# Patient Record
Sex: FEMALE | Race: WHITE | NOT HISPANIC OR LATINO | URBAN - METROPOLITAN AREA
[De-identification: names, ages, dates, MRNs, and addresses within clinical notes are randomized per-mention and may not be internally consistent; named-entity substitution may affect disease eponyms.]

---

## 2017-09-21 ENCOUNTER — IMPORTED ENCOUNTER (OUTPATIENT)
Dept: URBAN - METROPOLITAN AREA CLINIC 9 | Facility: CLINIC | Age: 67
End: 2017-09-21

## 2018-01-05 ENCOUNTER — IMPORTED ENCOUNTER (OUTPATIENT)
Dept: URBAN - METROPOLITAN AREA CLINIC 9 | Facility: CLINIC | Age: 68
End: 2018-01-05

## 2018-03-09 NOTE — PATIENT DISCUSSION
2 day PO: Patient is doing well post-operatively. The importance of post-op drop compliance was emphasized. Drop schedule reviewed with patient. Patient to call if any visual changes or concerns.

## 2018-03-12 NOTE — PATIENT DISCUSSION
5 day PO: Patient is doing well post-operatively. The importance of post-op drop compliance was emphasized. Drop schedule reviewed with patient. Patient to call if any visual changes or concerns.

## 2018-08-15 ENCOUNTER — IMPORTED ENCOUNTER (OUTPATIENT)
Dept: URBAN - METROPOLITAN AREA CLINIC 9 | Facility: CLINIC | Age: 68
End: 2018-08-15

## 2018-08-22 NOTE — PROCEDURE NOTE: CLINICAL
PROCEDURE NOTE: Punctal Plugs, Kizzy Pena ([id number], [id number]) #2 Bilateral Lower Lids. Diagnosis: Dry Eye Syndrome.

## 2019-02-20 ENCOUNTER — IMPORTED ENCOUNTER (OUTPATIENT)
Dept: URBAN - METROPOLITAN AREA CLINIC 9 | Facility: CLINIC | Age: 69
End: 2019-02-20

## 2019-07-17 ENCOUNTER — IMPORTED ENCOUNTER (OUTPATIENT)
Dept: URBAN - METROPOLITAN AREA CLINIC 9 | Facility: CLINIC | Age: 69
End: 2019-07-17

## 2019-07-31 ENCOUNTER — IMPORTED ENCOUNTER (OUTPATIENT)
Dept: URBAN - METROPOLITAN AREA CLINIC 9 | Facility: CLINIC | Age: 69
End: 2019-07-31

## 2020-05-13 ENCOUNTER — IMPORTED ENCOUNTER (OUTPATIENT)
Dept: URBAN - METROPOLITAN AREA CLINIC 9 | Facility: CLINIC | Age: 70
End: 2020-05-13

## 2020-09-23 ENCOUNTER — IMPORTED ENCOUNTER (OUTPATIENT)
Dept: URBAN - METROPOLITAN AREA CLINIC 9 | Facility: CLINIC | Age: 70
End: 2020-09-23

## 2020-11-06 ENCOUNTER — IMPORTED ENCOUNTER (OUTPATIENT)
Dept: URBAN - METROPOLITAN AREA CLINIC 9 | Facility: CLINIC | Age: 70
End: 2020-11-06

## 2021-03-31 NOTE — PATIENT DISCUSSION
[employer] OD rivka OS -0.50 to -0.75. Graft Cartilage Fenestration Text: The cartilage was fenestrated with a 2mm punch biopsy to help facilitate graft survival and healing.

## 2021-04-07 ENCOUNTER — IMPORTED ENCOUNTER (OUTPATIENT)
Dept: URBAN - METROPOLITAN AREA CLINIC 9 | Facility: CLINIC | Age: 71
End: 2021-04-07

## 2021-04-07 PROBLEM — Z79.899: Noted: 2021-04-07

## 2021-04-07 PROBLEM — H16.203: Noted: 2021-04-07

## 2021-04-07 PROBLEM — INACTIVE: Noted: 2021-04-07

## 2021-10-19 ASSESSMENT — VISUAL ACUITY
OD_SC: 20/25 -2 SN
OS_PH: 20/25 - SN
OD_SC: 20/25 SN
OS_SC: 20/40 SN
OS_CC: 20/30 SN
OS_SC: 20/40 SN
OD_SC: 20/25 + SN
OS_PH: 20/25 + SN
OD_CC: 20/20 SN
OD_SC: 20/20 -2 SN
OD_SC: 20/25 +2 SN
OS_CC: 20/30 SN
OD_CC: 20/20 SN
OS_SC: 20/40 -2 SN
OS_CC: 20/20 SN
OD_SC: 20/20 -2 SN
OS_PH: 20/30 - SN
OD_CC: 20/25 +2 SN
OS_SC: 20/40 SN
OS_SC: 20/50 SN
OS_SC: 20/50 -2 SN
OS_PH: 20/25 SN
OD_SC: 20/25 SN
OS_SC: 20/50 -2 SN

## 2021-10-19 ASSESSMENT — KERATOMETRY
OD_K2POWER_DIOPTERS: 41
OS_K2POWER_DIOPTERS: 41.25
OS_AXISANGLE2_DEGREES: 136
OD_AXISANGLE_DEGREES: 80
OS_AXISANGLE_DEGREES: 46
OS_K2POWER_DIOPTERS: 41.75
OD_K1POWER_DIOPTERS: 40.75
OD_K1POWER_DIOPTERS: 40.5
OD_AXISANGLE2_DEGREES: 175
OS_K1POWER_DIOPTERS: 41
OD_AXISANGLE2_DEGREES: 170
OS_AXISANGLE_DEGREES: 33
OD_K2POWER_DIOPTERS: 41
OD_AXISANGLE_DEGREES: 85
OS_K1POWER_DIOPTERS: 40.25
OS_AXISANGLE2_DEGREES: 123

## 2021-10-19 ASSESSMENT — TONOMETRY
OS_IOP_MMHG: 14
OD_IOP_MMHG: 15
OD_IOP_MMHG: 16
OS_IOP_MMHG: 15
OS_IOP_MMHG: 15
OD_IOP_MMHG: 18
OD_IOP_MMHG: 13
OS_IOP_MMHG: 15
OS_IOP_MMHG: 13
OD_IOP_MMHG: 15
OD_IOP_MMHG: 16
OD_IOP_MMHG: 15
OS_IOP_MMHG: 11
OS_IOP_MMHG: 17

## 2021-11-03 ENCOUNTER — ESTABLISHED PATIENT (OUTPATIENT)
Dept: URBAN - METROPOLITAN AREA CLINIC 15 | Facility: CLINIC | Age: 71
End: 2021-11-03

## 2021-11-03 DIAGNOSIS — H26.491: ICD-10-CM

## 2021-11-03 DIAGNOSIS — H16.203: ICD-10-CM

## 2021-11-03 DIAGNOSIS — H01.023: ICD-10-CM

## 2021-11-03 DIAGNOSIS — Z96.1: ICD-10-CM

## 2021-11-03 DIAGNOSIS — H01.026: ICD-10-CM

## 2021-11-03 DIAGNOSIS — Z79.899: ICD-10-CM

## 2021-11-03 PROCEDURE — 99214 OFFICE O/P EST MOD 30 MIN: CPT

## 2021-11-03 PROCEDURE — 92015 DETERMINE REFRACTIVE STATE: CPT

## 2021-11-03 PROCEDURE — 92134 CPTRZ OPH DX IMG PST SGM RTA: CPT

## 2021-11-03 RX ORDER — NEOMYCIN SULFATE, POLYMYXIN B SULFATE AND DEXAMETHASONE 3.5; 10000; 1 MG/G; [USP'U]/G; MG/G
OINTMENT OPHTHALMIC EVERY EVENING
Start: 2021-11-03

## 2021-11-03 ASSESSMENT — VISUAL ACUITY
OU_CC: J2
OS_SC: 20/50
OS_PH: 20/25
OD_SC: 20/30-1

## 2021-11-03 ASSESSMENT — TONOMETRY
OS_IOP_MMHG: 12
OD_IOP_MMHG: 11

## 2022-05-04 ENCOUNTER — ESTABLISHED PATIENT (OUTPATIENT)
Dept: URBAN - METROPOLITAN AREA CLINIC 15 | Facility: CLINIC | Age: 72
End: 2022-05-04

## 2022-05-04 DIAGNOSIS — H01.026: ICD-10-CM

## 2022-05-04 DIAGNOSIS — H26.491: ICD-10-CM

## 2022-05-04 DIAGNOSIS — Z79.899: ICD-10-CM

## 2022-05-04 DIAGNOSIS — H01.023: ICD-10-CM

## 2022-05-04 DIAGNOSIS — Z96.1: ICD-10-CM

## 2022-05-04 DIAGNOSIS — H16.203: ICD-10-CM

## 2022-05-04 PROCEDURE — 95060 OPH MUCOUS MEMBRANE TESTS: CPT

## 2022-05-04 PROCEDURE — 99214 OFFICE O/P EST MOD 30 MIN: CPT

## 2022-05-04 RX ORDER — LOTEPREDNOL ETABONATE 2.5 MG/ML: 1 SUSPENSION/ DROPS OPHTHALMIC TWICE A DAY

## 2022-05-04 RX ORDER — LIFITEGRAST 50 MG/ML: 1 SOLUTION/ DROPS OPHTHALMIC TWICE A DAY

## 2022-05-04 ASSESSMENT — VISUAL ACUITY
OS_SC: 20/40-1
OS_SC: J2
OD_SC: 20/30-1
OD_SC: J7

## 2022-05-04 ASSESSMENT — TONOMETRY
OS_IOP_MMHG: 15
OD_IOP_MMHG: 16

## 2022-07-15 ENCOUNTER — DIAGNOSTICS ONLY (OUTPATIENT)
Dept: URBAN - METROPOLITAN AREA CLINIC 15 | Facility: CLINIC | Age: 72
End: 2022-07-15

## 2022-07-15 DIAGNOSIS — Z79.899: ICD-10-CM

## 2022-07-15 PROCEDURE — 92083 EXTENDED VISUAL FIELD XM: CPT

## 2022-09-21 ENCOUNTER — ESTABLISHED PATIENT (OUTPATIENT)
Dept: URBAN - METROPOLITAN AREA CLINIC 15 | Facility: CLINIC | Age: 72
End: 2022-09-21

## 2022-09-21 DIAGNOSIS — Z79.899: ICD-10-CM

## 2022-09-21 DIAGNOSIS — Z96.1: ICD-10-CM

## 2022-09-21 PROCEDURE — 92015 DETERMINE REFRACTIVE STATE: CPT

## 2022-09-21 PROCEDURE — 99213 OFFICE O/P EST LOW 20 MIN: CPT

## 2022-09-21 ASSESSMENT — TONOMETRY
OS_IOP_MMHG: 14
OD_IOP_MMHG: 14

## 2022-09-21 ASSESSMENT — KERATOMETRY
OS_K2POWER_DIOPTERS: 40.50
OD_K2POWER_DIOPTERS: 39.75
OD_K1POWER_DIOPTERS: 41.00
OD_AXISANGLE_DEGREES: 17
OS_K1POWER_DIOPTERS: 41.75
OD_AXISANGLE2_DEGREES: 107
OS_AXISANGLE2_DEGREES: 50
OS_AXISANGLE_DEGREES: 140

## 2022-09-21 ASSESSMENT — VISUAL ACUITY
OS_SC: J1
OD_PH: 20/25-2
OD_GLARE: 20/40
OD_SC: J2
OS_PH: 20/30
OD_SC: 20/40-1
OS_SC: 20/60+2

## 2022-09-28 ENCOUNTER — DIAGNOSTICS ONLY (OUTPATIENT)
Dept: URBAN - METROPOLITAN AREA CLINIC 15 | Facility: CLINIC | Age: 72
End: 2022-09-28

## 2022-09-28 DIAGNOSIS — Z79.899: ICD-10-CM

## 2022-09-28 PROCEDURE — 92083 EXTENDED VISUAL FIELD XM: CPT

## 2022-09-28 ASSESSMENT — KERATOMETRY
OD_AXISANGLE2_DEGREES: 107
OS_AXISANGLE_DEGREES: 140
OD_AXISANGLE_DEGREES: 17
OS_K1POWER_DIOPTERS: 41.75
OS_K2POWER_DIOPTERS: 40.50
OD_K1POWER_DIOPTERS: 41.00
OS_AXISANGLE2_DEGREES: 50
OD_K2POWER_DIOPTERS: 39.75

## 2022-11-30 ENCOUNTER — ESTABLISHED PATIENT (OUTPATIENT)
Dept: URBAN - METROPOLITAN AREA CLINIC 15 | Facility: CLINIC | Age: 72
End: 2022-11-30

## 2022-11-30 DIAGNOSIS — H01.026: ICD-10-CM

## 2022-11-30 DIAGNOSIS — H16.203: ICD-10-CM

## 2022-11-30 DIAGNOSIS — H43.813: ICD-10-CM

## 2022-11-30 DIAGNOSIS — H01.023: ICD-10-CM

## 2022-11-30 PROCEDURE — 92014 COMPRE OPH EXAM EST PT 1/>: CPT

## 2022-11-30 PROCEDURE — 92134 CPTRZ OPH DX IMG PST SGM RTA: CPT

## 2022-11-30 ASSESSMENT — VISUAL ACUITY
OD_CC: J1
OS_CC: 20/25-2
OD_CC: 20/25-2
OS_CC: J2

## 2022-11-30 ASSESSMENT — TONOMETRY
OD_IOP_MMHG: 11
OS_IOP_MMHG: 11

## 2023-05-09 ENCOUNTER — ESTABLISHED PATIENT (OUTPATIENT)
Dept: URBAN - METROPOLITAN AREA CLINIC 14 | Facility: CLINIC | Age: 73
End: 2023-05-09

## 2023-05-09 DIAGNOSIS — H40.013: ICD-10-CM

## 2023-05-09 DIAGNOSIS — H43.813: ICD-10-CM

## 2023-05-09 DIAGNOSIS — H16.203: ICD-10-CM

## 2023-05-09 DIAGNOSIS — H01.026: ICD-10-CM

## 2023-05-09 DIAGNOSIS — Z79.899: ICD-10-CM

## 2023-05-09 DIAGNOSIS — H01.023: ICD-10-CM

## 2023-05-09 PROCEDURE — 99214 OFFICE O/P EST MOD 30 MIN: CPT

## 2023-05-09 PROCEDURE — 92133 CPTRZD OPH DX IMG PST SGM ON: CPT

## 2023-05-09 RX ORDER — CYCLOSPORINE 0.5 MG/ML: 1 EMULSION OPHTHALMIC TWICE A DAY

## 2023-05-09 ASSESSMENT — VISUAL ACUITY
OS_CC: 20/25
OS_GLARE: 20/40
OD_GLARE: 20/40
OD_CC: 20/25
OU_SC: J1+

## 2023-05-09 ASSESSMENT — TONOMETRY
OD_IOP_MMHG: 14
OS_IOP_MMHG: 15